# Patient Record
(demographics unavailable — no encounter records)

---

## 2024-10-14 NOTE — PHYSICAL EXAM
[de-identified] : Right hip She ambulates with a walker with a limp Inspection/ palpation: Wound is healed. No sign of infection Tenderness present.  Range of motion: extension and flexion with mild pain. Sensation is intact and normal. [de-identified] : AP, lateral, and oblique views of the pelvis and right hip were obtained today and revealed a right intertroch fracture. Short TFNA in place.Valgus alignment.

## 2024-10-14 NOTE — DISCUSSION/SUMMARY
[de-identified] : The underlying pathophysiology was reviewed with the patient. XR films were reviewed with the patient. Discussed at length the nature of the patient's condition. Patient instructed to stop Eliquis and start taking Aspirin 81 mg bid. Patient is advised to take calcium citrate, vitamin D and vitamin C. Instructed to eat dairy.  Advised to continue physical therapy, and ROM exercises.  Follow up in 6 weeks.

## 2024-10-14 NOTE — ADDENDUM
[FreeTextEntry1] : This note was written by Macario Hoffman on 10/14/2024 actively solely RAJEEV Aguiar M.D.     All medical record entries made by the Scribe were at my, RAJEEV Aguiar M.D. direction and personally dictated by me on 10/14/2024. I have personally reviewed the chart and agree that the record reflects my personal performance of the history, physical exam, assessment, and plan.

## 2024-10-14 NOTE — HISTORY OF PRESENT ILLNESS
[FreeTextEntry1] : Patient SAIRA NAVARRETE presents for 1st post op visit s/p ORIF of the right interochanteric basicervical hip fracture at [facility]. Patient is currently taking Eliquis 2.5 mg. Pt denies hx of DVT.  Patient works at human [employer] and usually drives to work. She has been doing outpatient PT 3X a week. She is using a walker.

## 2024-11-25 NOTE — DISCUSSION/SUMMARY
[de-identified] : The underlying pathophysiology was reviewed with the patient. XR films were reviewed with the patient. Discussed at length the nature of the patient's condition. Patient is advised to take calcium citrate, vitamin D and vitamin C. Instructed to eat dairy.  Advised to continue physical therapy, and ROM exercises. Instructed to stop taking Aspirin.   Patient should follow up in 3 months.

## 2024-11-25 NOTE — HISTORY OF PRESENT ILLNESS
[de-identified] : Today Nov 25, 2024, patient presents for follow up and further evaluation. Patient continues to take Advil x 4 tablets a day. [FreeTextEntry1] : Patient SAIRA NAVARRETE presents for 1st post op visit s/p ORIF of the right interochanteric basicervical hip fracture at [facility]. Patient is currently taking Eliquis 2.5 mg. Pt denies hx of DVT.  Patient works at human [employer] and usually drives to work. She has been doing outpatient PT 3X a week. She is using a walker.

## 2024-11-25 NOTE — ADDENDUM
[FreeTextEntry1] : This note was written by Macario Hoffman on 11/25/2024 actively solely RAJEEV Aguiar M.D.     All medical record entries made by the Scribe were at my, RAJEEV Aguiar M.D. direction and personally dictated by me on 11/25/2024. I have personally reviewed the chart and agree that the record reflects my personal performance of the history, physical exam, assessment, and plan.

## 2024-11-25 NOTE — PHYSICAL EXAM
[de-identified] : Right hip She ambulates with a walker with a limp Inspection/ palpation: Wound is healed. No sign of infection. Suture removed. Tenderness present.  Range of motion: extension and flexion with mild pain. Sensation is intact and normal. [de-identified] : AP, lateral, and oblique views of the pelvis and right hip were obtained today and revealed a right partially healed intertroch fracture. Short TFNA in place. Valgus alignment.

## 2025-01-16 NOTE — HISTORY OF PRESENT ILLNESS
[TextBox_13] : Referred by Dr. Fitch   Ms. SAIRA NAVARRETE is a 63 year old woman with a history of nicotine dependence   Over the telephone today we reviewed and confirmed that the patient meets screening eligibility criteria:  -Age:63 years old   Smoking status: former  Number of pack years smokin   Ms. NAVARRETE denies any personal history of lung cancer. Denies any s/s of lung cancer.  No lung cancer in a 1st degree relative.  Denies any history of occupational exposures.     [YearQuit] : 2010 [PacksperYear] : 20

## 2025-01-16 NOTE — PLAN
[FreeTextEntry1] : Plan:   -Low Dose CT chest for lung cancer screening   -Patient to follow up with Dr. cabrera    -Encouraged continued smoking abstinence   Engaged in shared decision making with Ms. SAIRA NAVARRETE.  Answered all questions. She verbalized understanding and agreement.  She knows to call back with any questions or concerns.

## 2025-01-27 NOTE — PHYSICAL EXAM
[de-identified] : Right hip Ambulates with cane Inspection/ palpation: Wound is healed. No sign of infection.  Tenderness present over greater trochanter.  Range of motion: extension and flexion with mild pain. Sensation is intact and normal. [de-identified] : AP, lateral, and oblique views of the pelvis and right hip were obtained today and revealed a right partially healed intertroch fracture. Short TFNA in place. Valgus alignment.

## 2025-01-27 NOTE — DISCUSSION/SUMMARY
[de-identified] : The underlying pathophysiology was reviewed with the patient. XR films were reviewed with the patient. Discussed at length the nature of the patient's condition. Patient is advised to take calcium citrate, vitamin D and vitamin C. Instructed to eat dairy.  Advised to continue physical therapy, and ROM exercises.   All questions answered, understanding verbalized. Patient in agreement with plan of care. Patient should follow up in 3 months.

## 2025-01-27 NOTE — HISTORY OF PRESENT ILLNESS
[de-identified] : Today Nov 25, 2024, patient presents for follow up and further evaluation. Patient continues to take Advil x 4 tablets a day.  Today, Jan 27, 2025, patient presents for follow up and further evaluation. Patient continues to complain of right hip pain.  She no longer ambulates with a walker. She is using a cane for ambulation. She's continuing with therapy.  [FreeTextEntry1] : Patient SAIRA NAVARRETE presents for 1st post op visit s/p ORIF of the right interochanteric basicervical hip fracture at [facility]. Patient is currently taking Eliquis 2.5 mg. Pt denies hx of DVT.  Patient works at human [employer] and usually drives to work. She has been doing outpatient PT 3X a week. She is using a walker.

## 2025-01-27 NOTE — RETURN TO WORK/SCHOOL
[FreeTextEntry1] : Ramila Abdirashid was seen in the office today on 01/27/2025 and evaluated by me for an Orthopedic visit. Please be advised that she may return to work with full duties on 02/05/2025. [FreeTextEntry2] : Dr. Niranjan Valdovinos M.D. on 01/27/2025

## 2025-01-27 NOTE — ADDENDUM
[FreeTextEntry1] : I, Rommel Dahl, wrote this note acting as a scribe for Dr. Niranjan Valdovinos on Jan 27, 2025.  All medical record entries made by the Scribe were at my, Dr. Niranajn Valdovinos MD., direction and personally dictated by me on 01/27/2025. I have personally reviewed the chart and agree that the record accurately reflects my personal performance of the history, physical exam, assessment and plan.

## 2025-04-14 NOTE — DISCUSSION/SUMMARY
[de-identified] : The patient was advised of the diagnosis.  The natural history of the pathology was explained in full to the patient in layman's terms. All questions were answered.  The risks and benefits of surgical and non-surgical treatment alternatives were explained in full to the patient.  Entered by Renetta Jamil acting as a scribe.

## 2025-04-14 NOTE — HISTORY OF PRESENT ILLNESS
[de-identified] : WC DOI: 9/17/2024 4/14/25: 63F here today for the RT hip. She is 7 months s/p RT hip ORIF by Dr. Ureña at [facility] (DOS: 9/18/24) following acute trauma- she was startled by a rat at work and fell down a step backwards. Did extensive PT following surgery- still doing PT 2-3 times a week. Having most pain in the lateral thigh. Advil as needed with some relief. Interested in second opinion. Ambulates with cane occ. No fevers/chills. [] : no [FreeTextEntry5] : Right hip pain since 9/17/24, stated she fell down a step. Went to the ER, stated she fracture her hip bone. Had emergency sx 9/19/24

## 2025-04-14 NOTE — HISTORY OF PRESENT ILLNESS
[de-identified] : WC DOI: 9/17/2024 4/14/25: 63F here today for the RT hip. She is 7 months s/p RT hip ORIF by Dr. Ureña at [facility] (DOS: 9/18/24) following acute trauma- she was startled by a rat at work and fell down a step backwards. Did extensive PT following surgery- still doing PT 2-3 times a week. Having most pain in the lateral thigh. Advil as needed with some relief. Interested in second opinion. Ambulates with cane occ. No fevers/chills. [] : no [FreeTextEntry5] : Right hip pain since 9/17/24, stated she fell down a step. Went to the ER, stated she fracture her hip bone. Had emergency sx 9/19/24

## 2025-04-14 NOTE — IMAGING
[de-identified] : RIGHT HIP inc healed min pain with rotation tenderness along IT band +2 pt pulses NVI   XR RT HIP (4/14/25) showing short nail baze cervical fx with some displacement

## 2025-04-14 NOTE — DISCUSSION/SUMMARY
[de-identified] : The patient was advised of the diagnosis.  The natural history of the pathology was explained in full to the patient in layman's terms. All questions were answered.  The risks and benefits of surgical and non-surgical treatment alternatives were explained in full to the patient.  Entered by Renetta Jamil acting as a scribe.

## 2025-04-14 NOTE — ASSESSMENT
[FreeTextEntry1] : 4/14/25: 7 months s/p ORIF at [facility] following acute trauma. Persistent lateral sided pain- does not seem to be infection on exam but XR shows malunion of fx. Will obtain RT hip CT scan to further eval. Given PT Rx to work on IT band tightness and improve gait and balance. F/up after CT scan

## 2025-04-14 NOTE — ASSESSMENT
[FreeTextEntry1] : 4/14/25: 7 months s/p ORIF at [facility] following acute trauma. Persistent lateral sided pain- does not seem to be infection on exam but XR shows malunion of fx. Will obtain RT hip CT scan to further eval. Given PT Rx to work on IT band tightness and improve gait and balance. F/up after CT scan

## 2025-04-14 NOTE — IMAGING
[de-identified] : RIGHT HIP inc healed min pain with rotation tenderness along IT band +2 pt pulses NVI   XR RT HIP (4/14/25) showing short nail baze cervical fx with some displacement

## 2025-04-29 NOTE — DATA REVIEWED
[CT Scan] : CT scan [Right] : of the right [Hip] : hip [I independently reviewed and interpreted images and report] : I independently reviewed and interpreted images and report

## 2025-06-26 NOTE — IMAGING
[de-identified] : RIGHT HIP inc healed min pain with rotation tenderness along IT band +2 pt pulses NVI   XR RT HIP (4/14/25) showing short nail baze cervical fx with some displacement

## 2025-06-26 NOTE — DATA REVIEWED
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

## 2025-06-26 NOTE — IMAGING
[de-identified] : RIGHT HIP inc healed min pain with rotation tenderness along IT band +2 pt pulses NVI   XR RT HIP (4/14/25) showing short nail baze cervical fx with some displacement

## 2025-06-27 NOTE — HISTORY OF PRESENT ILLNESS
[] : Post Surgical Visit: yes [de-identified] : 6/27/25: 2 weeks s/p right conversion ASHLEY - pain better than preop.  No fevers/chills.  Home PT.  Previous doc: WC DOI: 9/17/2024 4/14/25: 63F here today for the RT hip. She is 7 months s/p RT hip ORIF by Dr. Ureña at [facility] (DOS: 9/18/24) following acute trauma- she was startled by a rat at work and fell down a step backwards. Did extensive PT following surgery- still doing PT 2-3 times a week. Having most pain in the lateral thigh. Advil as needed with some relief. Interested in second opinion. Ambulates with cane occ. No fevers/chills. 4/29/25: Here to review CT scan. Continued lateral pain. No fevers/chills. Doing PT but feels it is aggravating her. Currently working. [de-identified] : home therapy  [de-identified] : 6/11/25 [de-identified] : R ASHLEY HERNANDEZ REM OF TFN SYNTH W RECLAIM

## 2025-06-27 NOTE — ASSESSMENT
[FreeTextEntry1] : Previous doc: 4/14/25: 7 months s/p ORIF at [facility] following acute trauma. Persistent lateral sided pain- does not seem to be infection on exam but XR shows malunion of fx. Will obtain RT hip CT scan to further eval. Given PT Rx to work on IT band tightness and improve gait and balance. F/up after CT scan 4/29/25: CT reviewed- findings discussed. Fracture not healed- discussed this is not common however would explain her persistent pain and can be fixed surgically. At this point explained it likely will not heal if it has not already. Discussed treatment options including ASHLEY vs fixation of fracture- risks and benefits explained. She is well informed and would like to proceed with R ASHLEY- removal of TFN synthese with reclaim monoblock and modular backup. Discussed risks of delaying surgery including further damage to the bone and surrounding tissue- she understands and elects to proceed. Also will obtain b/w to r/o infection- ESR and CRP. Can d/c PT. Surgical details discussed. All pt questions answered.   6/27/25:  2 weeks postop doing very well, cont PT, return in 6 weeks.

## 2025-06-27 NOTE — ASSESSMENT
[FreeTextEntry1] : Previous doc: 4/14/25: 7 months s/p ORIF at [facility] following acute trauma. Persistent lateral sided pain- does not seem to be infection on exam but XR shows malunion of fx. Will obtain RT hip CT scan to further eval. Given PT Rx to work on IT band tightness and improve gait and balance. F/up after CT scan 4/29/25: CT reviewed- findings discussed. Fracture not healed- discussed this is not common however would explain her persistent pain and can be fixed surgically. At this point explained it likely will not heal if it has not already. Discussed treatment options including ASHLEY vs fixation of fracture- risks and benefits explained. She is well informed and would like to proceed with R ASHLEY- removal of TFN synthese with reclaim monoblock and modular backup. Discussed risks of delaying surgery including further damage to the bone and surrounding tissue- she understands and elects to proceed. Also will obtain b/w to r/o infection- ESR and CRP. Can d/c PT. Surgical details discussed. All pt questions answered.   6/27/25:  2 weeks postop doing very well, cont PT, return in 6 weeks.

## 2025-06-27 NOTE — WORK
[Partial] : partial [Does not reveal pre-existing condition(s) that may affect treatment/prognosis] : does not reveal pre-existing condition(s) that may affect treatment/prognosis [Patient] : patient [I provided the services listed above] :  I provided the services listed above. [Total (100%)] : total (100%) [FreeTextEntry1] : guarded

## 2025-06-27 NOTE — PHYSICAL EXAM
[Right] : right hip with pelvis [AP] : anteroposterior [Lateral] : lateral [Components well fixed, in good position] : Components well fixed, in good position [de-identified] : Right hip: MIld swelling.  Inc c/d/i.  JUNE.  Walker. no

## 2025-06-27 NOTE — HISTORY OF PRESENT ILLNESS
[] : Post Surgical Visit: yes [de-identified] : 6/27/25: 2 weeks s/p right conversion ASHLEY - pain better than preop.  No fevers/chills.  Home PT.  Previous doc: WC DOI: 9/17/2024 4/14/25: 63F here today for the RT hip. She is 7 months s/p RT hip ORIF by Dr. Ureña at [facility] (DOS: 9/18/24) following acute trauma- she was startled by a rat at work and fell down a step backwards. Did extensive PT following surgery- still doing PT 2-3 times a week. Having most pain in the lateral thigh. Advil as needed with some relief. Interested in second opinion. Ambulates with cane occ. No fevers/chills. 4/29/25: Here to review CT scan. Continued lateral pain. No fevers/chills. Doing PT but feels it is aggravating her. Currently working. [de-identified] : home therapy  [de-identified] : 6/11/25 [de-identified] : R ASHLEY HERNANDEZ REM OF TFN SYNTH W RECLAIM

## 2025-06-27 NOTE — DISCUSSION/SUMMARY
[de-identified] : The patient is doing well at this time. The patient will be started on a course of physical therapy. I recommended that the patient works on range of motion at home and was shown how to do this. I encouraged the patient to increase ambulation. The patient can continue to take Tylenol for occasional discomfort. The patient was advised not to do any dental work for the first three months following the surgery. We will see the patient  back for a follow-up for a repeat evaluation. The patient  will call or return earlier for any questions or concerns.  Signs and symptoms of infection reviewed and patient advised to call immediately for redness, fevers, and/or chills.  I saw the patient under the supervision of Dr. Almonte and followed his plan of care.

## 2025-06-27 NOTE — PHYSICAL EXAM
[Right] : right hip with pelvis [AP] : anteroposterior [Lateral] : lateral [Components well fixed, in good position] : Components well fixed, in good position [de-identified] : Right hip: MIld swelling.  Inc c/d/i.  JUNE.  Walker.

## 2025-06-27 NOTE — DISCUSSION/SUMMARY
[de-identified] : The patient is doing well at this time. The patient will be started on a course of physical therapy. I recommended that the patient works on range of motion at home and was shown how to do this. I encouraged the patient to increase ambulation. The patient can continue to take Tylenol for occasional discomfort. The patient was advised not to do any dental work for the first three months following the surgery. We will see the patient  back for a follow-up for a repeat evaluation. The patient  will call or return earlier for any questions or concerns.  Signs and symptoms of infection reviewed and patient advised to call immediately for redness, fevers, and/or chills.  I saw the patient under the supervision of Dr. Almonte and followed his plan of care.

## 2025-07-08 NOTE — ASSESSMENT
[FreeTextEntry1] : Previous doc: 4/14/25: 7 months s/p ORIF at [facility] following acute trauma. Persistent lateral sided pain- does not seem to be infection on exam but XR shows malunion of fx. Will obtain RT hip CT scan to further eval. Given PT Rx to work on IT band tightness and improve gait and balance. F/up after CT scan 4/29/25: CT reviewed- findings discussed. Fracture not healed- discussed this is not common however would explain her persistent pain and can be fixed surgically. At this point explained it likely will not heal if it has not already. Discussed treatment options including SAHLEY vs fixation of fracture- risks and benefits explained. She is well informed and would like to proceed with R ASHLEY- removal of TFN synthese with reclaim monoblock and modular backup. Discussed risks of delaying surgery including further damage to the bone and surrounding tissue- she understands and elects to proceed. Also will obtain b/w to r/o infection- ESR and CRP. Can d/c PT. Surgical details discussed. All pt questions answered.  6/27/25:  2 weeks postop doing very well, cont PT, return in 6 weeks.  7/8/25: 4 weeks s/p R conversion ASHLEY. Had flare-up last week after PT used massage gun on her. She is doing much better today. Will have her continue with gentle ROM with PT. f/u as scheduled in 6 weeks and repeat XR.

## 2025-07-08 NOTE — IMAGING
[de-identified] : RIGHT HIP Incision is clean and dry with no drainage -  Sensation intact Motor 4/5 to LE  +1 edema LE  Amb with cane    Xray 3 views Right hip/pelvis - Implants good position and well fixed - no fracture or dislocation and Leg length wnl

## 2025-07-08 NOTE — HISTORY OF PRESENT ILLNESS
[Dull/Aching] : dull/aching [] : yes [de-identified] : 7/8/25: 4 weeks s/p R conversion ASHLEY. Had flare-up of pain last week at PT. State it may have been irritated after using a massage gun. Doing better today.   Previous doc: WC DOI: 9/17/2024 4/14/25: 63F here today for the RT hip. She is 7 months s/p RT hip ORIF by Dr. Ureña at [facility] (DOS: 9/18/24) following acute trauma- she was startled by a rat at work and fell down a step backwards. Did extensive PT following surgery- still doing PT 2-3 times a week. Having most pain in the lateral thigh. Advil as needed with some relief. Interested in second opinion. Ambulates with cane occ. No fevers/chills. 4/29/25: Here to review CT scan. Continued lateral pain. No fevers/chills. Doing PT but feels it is aggravating her. Currently working. 6/27/25: 2 weeks s/p right conversion ASHLEY - pain better than preop.  No fevers/chills.  Home PT.  [FreeTextEntry5] : while at PT on 7/2/25 was doing exercises and was sitting and felt a crack.  [de-identified] : PT

## 2025-07-08 NOTE — DISCUSSION/SUMMARY
[de-identified] : The incision was inspected and was clean and dry with no drainage.  The patient was instructed to call for fevers, chills, wound drainage, wound opening, redness, or any other concerns.  The patient is doing well at this time. The patient will be started on a course of physical therapy. I recommended that the patient works on range of motion at home and was shown how to do this. I encouraged the patient to increase ambulation. The patient can continue to take Tylenol for occasional discomfort. The patient was advised not to do any dental work for the first three months following the surgery. We will see the patient  back for a follow-up for a repeat evaluation. The patient will call or return earlier for any questions or concerns.  Signs and symptoms of infection reviewed and patient advised to call immediately for redness, fevers, and/or chills.